# Patient Record
Sex: FEMALE | Race: WHITE
[De-identification: names, ages, dates, MRNs, and addresses within clinical notes are randomized per-mention and may not be internally consistent; named-entity substitution may affect disease eponyms.]

---

## 2018-04-07 ENCOUNTER — HOSPITAL ENCOUNTER (EMERGENCY)
Dept: HOSPITAL 45 - C.EDB | Age: 23
Discharge: HOME | End: 2018-04-07
Payer: COMMERCIAL

## 2018-04-07 VITALS
TEMPERATURE: 97.16 F | SYSTOLIC BLOOD PRESSURE: 105 MMHG | DIASTOLIC BLOOD PRESSURE: 64 MMHG | HEART RATE: 64 BPM | OXYGEN SATURATION: 99 %

## 2018-04-07 VITALS
HEIGHT: 64.02 IN | WEIGHT: 147.71 LBS | BODY MASS INDEX: 25.22 KG/M2 | BODY MASS INDEX: 25.22 KG/M2 | WEIGHT: 147.71 LBS | HEIGHT: 64.02 IN

## 2018-04-07 DIAGNOSIS — R42: Primary | ICD-10-CM

## 2018-04-07 LAB
BASOPHILS # BLD: 0.02 K/UL (ref 0–0.2)
BASOPHILS NFR BLD: 0.2 %
BUN SERPL-MCNC: 9 MG/DL (ref 7–18)
CALCIUM SERPL-MCNC: 8.7 MG/DL (ref 8.5–10.1)
CO2 SERPL-SCNC: 24 MMOL/L (ref 21–32)
CREAT SERPL-MCNC: 0.67 MG/DL (ref 0.6–1.2)
EOS ABS #: 0.14 K/UL (ref 0–0.5)
EOSINOPHIL NFR BLD AUTO: 227 K/UL (ref 130–400)
GLUCOSE SERPL-MCNC: 81 MG/DL (ref 70–99)
HCT VFR BLD CALC: 42.9 % (ref 37–47)
HGB BLD-MCNC: 14.9 G/DL (ref 12–16)
IG#: 0.01 K/UL (ref 0–0.02)
IMM GRANULOCYTES NFR BLD AUTO: 20.5 %
LYMPHOCYTES # BLD: 1.85 K/UL (ref 1.2–3.4)
MCH RBC QN AUTO: 31.5 PG (ref 25–34)
MCHC RBC AUTO-ENTMCNC: 34.7 G/DL (ref 32–36)
MCV RBC AUTO: 90.7 FL (ref 80–100)
MONO ABS #: 0.41 K/UL (ref 0.11–0.59)
MONOCYTES NFR BLD: 4.6 %
NEUT ABS #: 6.58 K/UL (ref 1.4–6.5)
NEUTROPHILS # BLD AUTO: 1.6 %
NEUTROPHILS NFR BLD AUTO: 73 %
PMV BLD AUTO: 9.4 FL (ref 7.4–10.4)
POTASSIUM SERPL-SCNC: 3.5 MMOL/L (ref 3.5–5.1)
RED CELL DISTRIBUTION WIDTH CV: 12.5 % (ref 11.5–14.5)
RED CELL DISTRIBUTION WIDTH SD: 42 FL (ref 36.4–46.3)
SODIUM SERPL-SCNC: 136 MMOL/L (ref 136–145)
WBC # BLD AUTO: 9.01 K/UL (ref 4.8–10.8)

## 2018-04-07 NOTE — EMERGENCY ROOM VISIT NOTE
History


First contact with patient:  12:02


Chief Complaint:  VERTIGO


Stated Complaint:  VERTIGO


Nursing Triage Summary:  


pt here with dizziness since 0300 this am. pt denies any headache or injury. pt 


states is dizzy with lying flat and getting up. pt states only nauseated when 


trying a "maneuver" her brother recommended to "dislodge crystal" in ear





History of Present Illness


The patient is a 22 year old female who presents to the Emergency Room via 

private vehicle accompanied by male with complaints of "vertigo".  The patient 

states that this morning around 3 AM she woke up noting that she was sleeping 

on her glasses and then realized that the room was spinning.  She describes it 

as vertigo.  She states that she tried the Epley maneuver as recommended by her 

brother and it seemed to make it worse.  She denies any history of this before.

  She notes associated nausea.  No vomiting, no extremity weakness or speech 

troubles.  She denies any recent alcohol use, or head trauma/injury.  Pain 0/10





Review of Systems


A complete 10-point Review of Systems was discussed with the patient, with 

pertinent positives and negatives listed in the History of Present Illness. All 

remaining Review of Systems questions can be considered negative unless 

otherwise specified.





Past Medical/Surgical History


No pertinent





Family History


Non contributory





Social History


Smoking Status:  Never Smoker


Patient is a [employer] student and lives locally.





Current/Historical Medications


Scheduled PRN


Meclizine Hcl (Meclizine Hcl), 1 TAB PO TID PRN for Dizziness or Vertigo





Physical Exam


Vital Signs











  Date Time  Temp Pulse Resp B/P (MAP) Pulse Ox O2 Delivery O2 Flow Rate FiO2


 


4/7/18 15:20 36.2 64 17 105/64 99   


 


4/7/18 14:25  68 17 107/61 100 Room Air  


 


4/7/18 11:57 36.2 78 16 106/66 97 Room Air  











Physical Exam


VITAL SIGNS - Vital signs and nursing notes were reviewed.  Stable.  She does 

appear to be with decreased temperature but I believe this is secondary to 

drinking oral liquids.  No evidence of hypothermia on exam.


GENERAL - 22-year-old female appearing her stated age who is in no acute 

distress. Communicates well with provider and answers questions appropriately.


SKIN - Without rashes.


HEAD - NC/AT.


EYES - PERRL with EOMI bilaterally. Sclera anicteric. Palpebral conjunctiva 

pink and moist with no injection noted. No nystagmus.


EARS - No deformities of external structures noted on gross examination 

bilaterally.  External auditory canals without discharge or otorrhea. Tympanic 

membranes pearly gray without retraction or bulging. No fluid or purulent 

material visualized behind the TM. Handle of malleus, umbo, cone of light, pars 

tensa/flaccid all easily visualized.


NOSE - Midline and without cyanosis. No epistaxis or purulent drainage noted. 

Septum midline without deviation or septal hematoma noted.


MOUTH/OROPHARYNX - Without perioral cyanosis. Buccal mucosa pink and moist and 

without leukoplakia. Tongue midline with equal elevation of palate bilaterally. 

No tonsillar hypertrophy, erythema, or exudates noted. Fair dentition noted.


NECK - Neck with FROM. Supple to palpation. No lymphadenopathy noted. No nuchal 

rigidity.


LUNGS - Chest wall symmetric without accessory muscle use, intercostals 

retractions, or central cyanosis. Normal vesicular breath sounds CTA B/L. No 

wheezes, rales, or rhonchi appreciated.


CARDIAC - RRR with S1/S2. No murmur, rubs, or gallops appreciated. 


EXTREMITIES - No clubbing or peripheral cyanosis. +5/5 strength noted in UE/LE 

bilaterally.


NEUROLOGIC - Cranial nerves II through XII grossly intact. Sensory intact to 

light touch throughout.


PSYCH - A&Ox3 and cooperates fully with examiner. Pt is very pleasant and 

interacts well with examiner.





Medical Decision & Procedures


Laboratory Results


4/7/18 12:25








Red Blood Count 4.73, Mean Corpuscular Volume 90.7, Mean Corpuscular Hemoglobin 

31.5, Mean Corpuscular Hemoglobin Concent 34.7, Mean Platelet Volume 9.4, 

Neutrophils (%) (Auto) 73.0, Lymphocytes (%) (Auto) 20.5, Monocytes (%) (Auto) 

4.6, Eosinophils (%) (Auto) 1.6, Basophils (%) (Auto) 0.2, Neutrophils # (Auto) 

6.58, Lymphocytes # (Auto) 1.85, Monocytes # (Auto) 0.41, Eosinophils # (Auto) 

0.14, Basophils # (Auto) 0.02





4/7/18 12:25

















Test


  4/7/18


12:25 4/7/18


14:35


 


White Blood Count


  9.01 K/uL


(4.8-10.8) 


 


 


Red Blood Count


  4.73 M/uL


(4.2-5.4) 


 


 


Hemoglobin


  14.9 g/dL


(12.0-16.0) 


 


 


Hematocrit 42.9 % (37-47)  


 


Mean Corpuscular Volume


  90.7 fL


() 


 


 


Mean Corpuscular Hemoglobin


  31.5 pg


(25-34) 


 


 


Mean Corpuscular Hemoglobin


Concent 34.7 g/dl


(32-36) 


 


 


Platelet Count


  227 K/uL


(130-400) 


 


 


Mean Platelet Volume


  9.4 fL


(7.4-10.4) 


 


 


Neutrophils (%) (Auto) 73.0 %  


 


Lymphocytes (%) (Auto) 20.5 %  


 


Monocytes (%) (Auto) 4.6 %  


 


Eosinophils (%) (Auto) 1.6 %  


 


Basophils (%) (Auto) 0.2 %  


 


Neutrophils # (Auto)


  6.58 K/uL


(1.4-6.5) 


 


 


Lymphocytes # (Auto)


  1.85 K/uL


(1.2-3.4) 


 


 


Monocytes # (Auto)


  0.41 K/uL


(0.11-0.59) 


 


 


Eosinophils # (Auto)


  0.14 K/uL


(0-0.5) 


 


 


Basophils # (Auto)


  0.02 K/uL


(0-0.2) 


 


 


RDW Standard Deviation


  42.0 fL


(36.4-46.3) 


 


 


RDW Coefficient of Variation


  12.5 %


(11.5-14.5) 


 


 


Immature Granulocyte % (Auto) 0.1 %  


 


Immature Granulocyte # (Auto)


  0.01 K/uL


(0.00-0.02) 


 


 


Anion Gap


  6.0 mmol/L


(3-11) 


 


 


Est Creatinine Clear Calc


Drug Dose 121.9 ml/min 


  


 


 


Estimated GFR (


American) 142.6 


  


 


 


Estimated GFR (Non-


American 123.0 


  


 


 


BUN/Creatinine Ratio 13.6 (10-20)  


 


Calcium Level


  8.7 mg/dl


(8.5-10.1) 


 


 


Magnesium Level


  2.2 mg/dl


(1.8-2.4) 


 


 


Urine Color  YELLOW 


 


Urine Appearance  CLEAR (CLEAR) 


 


Urine pH  7.5 (4.5-7.5) 


 


Urine Specific Gravity


  


  1.008


(1.000-1.030)


 


Urine Protein  NEG (NEG) 


 


Urine Glucose (UA)  NEG (NEG) 


 


Urine Ketones  NEG (NEG) 


 


Urine Occult Blood  NEG (NEG) 


 


Urine Nitrite  NEG (NEG) 


 


Urine Bilirubin  NEG (NEG) 


 


Urine Urobilinogen  NEG (NEG) 


 


Urine Leukocyte Esterase  NEG (NEG) 


 


Urine Pregnancy Test  NEG (NEG) 











Medications Administered











 Medications


  (Trade)  Dose


 Ordered  Sig/Mela


 Route  Start Time


 Stop Time Status Last Admin


Dose Admin


 


 Meclizine HCl


  (Antivert Tab)  25 mg  NOW  STAT


 PO  4/7/18 12:13


 4/7/18 12:16 DC 4/7/18 12:20


25 MG











Medical Decision


Patient was seen and evaluated as above in room B8.  She presents to us with 

complaints of vertigo.  She is nontoxic on exam and has no evidence of CVA.  No 

nystagmus and very little residual vertigo per patient.  Review was performed 

of nursing notes and vital signs. After obtaining a thorough history and 

physical examination the above work up was performed.  I did elect to obtain 

baseline labs.  CBC reveals no leukocytosis or anemia.  Coags reveal no 

emergent process.  Urine negative.  Urine pregnancy test negative.  I suspect 

this to be an isolated benign vertigo event.  Case was discussed with the 

attending physician.  She was given Antivert.  She improved.  She will be given 

a small prescription for this and is to follow-up with [facility] or return with worsening.  Because of her improvement with the 

Antivert I will not attempt any further maneuvers of which she notes she 

attempted prior to the hospital here today.  There was no subjective nor 

objective evidence of head trauma.  The patient was educated upon management, 

had questions answered prior to discharge, and was discharged home in good 

condition.





In the evaluation and treatment of this patient, the following differential 

diagnoses were considered: Concussion, Contrecoup Injury, Brain Tumor, 

Depression, Encephalitis, Hypothyroidism, Meningitis, CVA, TIA, Migraine, 

Cluster Headache, Intracranial Abnormality, Intracranial Hemorrhage, Subdural 

Hematoma, Subarachnoid Hemorrhage, Hydrocephalus.





Impression





 Primary Impression:  


 Vertigo





Departure Information


Dispostion


Home / Self-Care





Condition


GOOD





Prescriptions





Meclizine Hcl (MECLIZINE HCL) 25 Mg Tab


1 TAB PO TID Y for Dizziness or Vertigo for 10 Days, #30 TAB


   Prov: Surinder Llamas PA-C         4/7/18





Referrals


No Doctor, Assigned (PCP)








[facility]





Patient Instructions


My [facility]





Additional Instructions





You have been treated in the Emergency Department for Vertigo.





Meclizine 1 tab every 8 hours as needed for vertigo. Only need when feeling the 

vertigo.





Please call [facility] to schedule follow-up.





Please stay well-hydrated.





Return to the Emergency Department if your current symptoms worsen despite 

treatment course outlined above, or if you develop any of the following symptoms

: intractable pain despite aforementioned treatment course, visual disturbances

, loss of vision, unilateral weakness or facial drooping, slurring of speech, 

loss of coordination, or loss of consciousness.





Please return with any new/concerning symptoms.